# Patient Record
Sex: FEMALE | Race: WHITE | NOT HISPANIC OR LATINO | Employment: FULL TIME | URBAN - METROPOLITAN AREA
[De-identification: names, ages, dates, MRNs, and addresses within clinical notes are randomized per-mention and may not be internally consistent; named-entity substitution may affect disease eponyms.]

---

## 2021-08-18 NOTE — PROGRESS NOTES
[facility] - Obstetrics [facility] Gynecology Clinic    8/19/2021    Chief Complaint: PCOS, fertility      History of Present Illness:  Jessica Gonzales is a 26 year old G0 female who presents for consultation regarding PCOS and fertility.    She is here today with her mother.  Jessica travelled from [place] for this consultation. She has an aunt that lives in the area.  She reports that she was diagnosed with PCOS several years ago.  Describes longstanding (15 years) history of hirsutism mostly affecting her face.  She states that she shaves her face daily due to this.  Denies history of significant acne.  She reports that the PCOS was diagnosed based on cysts seen on her ultrasound and she has had laboratory work done as well.  With regard to her cycles she reports that they are regular, occurring every approximately 26 days.  She has been tracking them on her phone over the last 2 months. On chart review her cycles are reported as irregular.    With regards to treatment of PCOS she has never been on an oral contraceptive pill. She has taken supplements including cinnamon and colt-inositol (no longer taking). She was recently started on metformin and spironolactone.     She endorses a history of Crohn's disease and has had several colonoscopies in the past.  She is not currently on any medications with this and has not established care with an adult gastroenterologist. Reports well controlled.     Previous laboratory testing reviewed in Care Everywhere and detailed below:  10/13/2020  - 17 OHP: 53  - DHEAS: 358  - FSH: 5.5  - Estradiol: 52  - LH: 3.5  - Prolactin: 21.8  - TSH 1.17  7/20/2021  - A1c 5.0  - Creatinine 1.06    With regards to her mental health she reports history of anxiety and depression. Sees a mental health provider near home. Endorses fleeting thoughts suicidal ideation, no plan. Feels her mood is stable and has good family support.     Gynecologic History:  LMP- Patient's last menstrual period was  "2021.  Menarche - [age]  Menses- Regular q26 days  Menstrual Symptoms: Heavy, passes clots, significant cramping  Contraception: None  Last Pap Date: Patient reported normal pap smear within the past two years, on chart review most recent testing visible is: 2018 NILM, HPV positive HR other  History of abnormal pap: yes, reports mild changed; no history of cervical procedure  History of sexually transmitted diseases: HPV     Obstetric History:  G0      Current Medications:  Patient's Medications   New Prescriptions    No medications on file   Previous Medications    ASPIRIN-ACETAMINOPHEN-CAFFEINE (EXCEDRIN MIGRAINE) 250-250-65 MG TABLET    Take 2 tablets by mouth    CALCIUM CARBONATE 1250 (500 CA) MG CHEW    Take 2 tablets by mouth    FLUOXETINE (PROZAC) 10 MG CAPSULE    Take 10 mg by mouth    METFORMIN (GLUCOPHAGE-XR) 500 MG 24 HR TABLET    Take 1,000 mg by mouth    PANTOPRAZOLE (PROTONIX) 20 MG EC TABLET    Take 20 mg by mouth    PROPRANOLOL (INDERAL) 20 MG TABLET    Take 20 mg by mouth    SPIRONOLACTONE (ALDACTONE) 25 MG TABLET    Take 25 mg by mouth    ZOLPIDEM (AMBIEN) 10 MG TABLET    Take 10 mg by mouth   Modified Medications    No medications on file   Discontinued Medications    No medications on file     Past Medical History:  Insulin resistance/T2DM  Crohn's disease (not on treatment)  Nephritis (pt unsure of etiology)  Depression/Anxiety     Past Surgical History:  - Tonsillectomy  - Adenoidectomy  - Rhinoplasty  - Ear tubes  - Ankle surgery  - Colonoscopies     Allergies:  Allergies   Allergen Reactions     Ibuprofen Other (See Comments)     Patient cannot take due to her nephritis         Social History:  . Tobacco 1ppd. No drug or alcohol use. [place]  [employer] [place]      Family History:  Mother has history of  births. No history of infertility.     Exam:  /75   Pulse 61   Ht 1.702 m (5' 7\")   Wt 116.6 kg (257 lb)   LMP 2021   Breastfeeding " No   BMI 40.25 kg/m    Constitutional: well appearing, NAD  Cardiovascular: well perfused  Respiratory: unlabored breathing on room air   Pelvic Exam: Deferred  Psychiatric: mentation appears normal and affect normal/bright     Labs:   See above for labs in CareEverywhere     Imaging:   10/7/2020  IMPRESSION:   Retroverted uterus which appears normal.   Right ovary demonstrates a probable hemorrhagic cyst measuring 2.1 cm.   Left ovary demonstrates 2 probable hemorrhagic cysts, one measuring 2.6 cm and the other 3 cm in greatest dimension as well as a simple appearing 2.9 cm left ovarian cyst.   Large amount of fluid in the cul-de-sac.      Assessment/Plan:  26 year old G0 with pmh notable for insulin resistance/T2DM, Crohn's disease, nephritis, depression/anxiety, and obesity who presents for consultation regarding PCOS and fertility. Long discussion with the patient and her mother today about PCOS, fertility, and need to optimize her health prior to pregnancy. As they live in [place], Jessica will discuss the below recommendations/labs with her primary care provider to order. Discussed that she can schedule a telephone visit with our clinic to discuss laboratory results remotely. She will likely need to establish care with an OB/GYN in her area due to her multiple co-morbidities and need for further infertility evaluation.    #PCOS  #Infertility  - Currently taking metformin and spironolactone. Agree with continuing metformin especially in light of insulin resistance vs T2DM (most recent A1c 5.0). Reviewed that spironolactone is contraindicated in pregnancy and that she should discontinue this if she becomes pregnant.  - In discussing history with Jessica her FSH/estradiol were not timed labs. Recommend repeating these on day 3 of her cycle. Also discussed obtaining day 21 progesterone to assess for ovulation given her report of regular cycles  - Other labs: AMH (although this is likely to be elevated in the  setting of PCOS), prolactin (mildly elevated on last check), testosterone   - Continue to track menstrual cycles  - Also reviewed other etiologies of infertility aside from PCOS/ovulatory dysfunction including male factor and tubal factor infertility. No evidence of structural abnormalities on pelvic ultrasound. Consider further evaluation pending results of the above evaluation.      #Obesity  #Tobacco use  - Discussion with patient today about optimizing her personal health and well-being prior to pregnancy. Encouraged healthful diet and exercise. Discussed improvements seen with even 10% weight loss.   - Encouraged smoking cessation as tobacco use is associated with infertility and pregnancy complications     #Crohn's disease  - Not currently on medication, hasn't been seen for this in years. Recommend she establish care with adult gastroenterologist.    #Depression/Anxiety  - PHQ-9 8 today with several days of thoughts of being better off dead/self-harm reported. Patient endorses fleeting passive SI but has no intent or plan. Feels her mood is stable. Has good family support. Will continue to follow with her Mental Health provider in [place].    Staffed with Dr. Fabiana Silva MD PGY4    The Patient was seen in Resident Continuity Clinic by FELICITAS SILVA.  I reviewed the history & exam. Assessment and plan were jointly made.    Bree Jung MD

## 2021-08-19 ENCOUNTER — OFFICE VISIT (OUTPATIENT)
Dept: OBGYN | Facility: CLINIC | Age: 26
End: 2021-08-19
Payer: OTHER GOVERNMENT

## 2021-08-19 VITALS
SYSTOLIC BLOOD PRESSURE: 117 MMHG | DIASTOLIC BLOOD PRESSURE: 75 MMHG | HEIGHT: 67 IN | WEIGHT: 257 LBS | HEART RATE: 61 BPM | BODY MASS INDEX: 40.34 KG/M2

## 2021-08-19 DIAGNOSIS — N97.9 FEMALE INFERTILITY: ICD-10-CM

## 2021-08-19 DIAGNOSIS — F41.9 ANXIETY: ICD-10-CM

## 2021-08-19 DIAGNOSIS — E66.813 CLASS 3 SEVERE OBESITY WITH BODY MASS INDEX (BMI) OF 40.0 TO 44.9 IN ADULT, UNSPECIFIED OBESITY TYPE, UNSPECIFIED WHETHER SERIOUS COMORBIDITY PRESENT (H): ICD-10-CM

## 2021-08-19 DIAGNOSIS — E66.01 CLASS 3 SEVERE OBESITY WITH BODY MASS INDEX (BMI) OF 40.0 TO 44.9 IN ADULT, UNSPECIFIED OBESITY TYPE, UNSPECIFIED WHETHER SERIOUS COMORBIDITY PRESENT (H): ICD-10-CM

## 2021-08-19 DIAGNOSIS — E28.2 PCOS (POLYCYSTIC OVARIAN SYNDROME): Primary | ICD-10-CM

## 2021-08-19 DIAGNOSIS — F32.A DEPRESSION, UNSPECIFIED DEPRESSION TYPE: ICD-10-CM

## 2021-08-19 DIAGNOSIS — E88.819 INSULIN RESISTANCE: ICD-10-CM

## 2021-08-19 PROCEDURE — G0463 HOSPITAL OUTPT CLINIC VISIT: HCPCS

## 2021-08-19 PROCEDURE — 99203 OFFICE O/P NEW LOW 30 MIN: CPT | Performed by: OBSTETRICS & GYNECOLOGY

## 2021-08-19 RX ORDER — FLUOXETINE 10 MG/1
10 CAPSULE ORAL
COMMUNITY
Start: 2021-07-20 | End: 2021-10-18

## 2021-08-19 RX ORDER — PROPRANOLOL HYDROCHLORIDE 20 MG/1
20 TABLET ORAL
COMMUNITY
Start: 2021-07-20 | End: 2022-07-25

## 2021-08-19 RX ORDER — CALCIUM CARBONATE 500(1250)
2 TABLET,CHEWABLE ORAL
COMMUNITY

## 2021-08-19 RX ORDER — SPIRONOLACTONE 25 MG/1
25 TABLET ORAL
COMMUNITY
Start: 2021-07-20 | End: 2022-07-25

## 2021-08-19 RX ORDER — METFORMIN HCL 500 MG
1000 TABLET, EXTENDED RELEASE 24 HR ORAL
COMMUNITY
Start: 2021-07-20 | End: 2022-07-25

## 2021-08-19 RX ORDER — PANTOPRAZOLE SODIUM 20 MG/1
20 TABLET, DELAYED RELEASE ORAL
COMMUNITY
Start: 2021-03-29

## 2021-08-19 RX ORDER — ZOLPIDEM TARTRATE 10 MG/1
10 TABLET ORAL
COMMUNITY
Start: 2021-06-17

## 2021-08-19 ASSESSMENT — ANXIETY QUESTIONNAIRES
2. NOT BEING ABLE TO STOP OR CONTROL WORRYING: MORE THAN HALF THE DAYS
6. BECOMING EASILY ANNOYED OR IRRITABLE: MORE THAN HALF THE DAYS
GAD7 TOTAL SCORE: 10
1. FEELING NERVOUS, ANXIOUS, OR ON EDGE: SEVERAL DAYS
5. BEING SO RESTLESS THAT IT IS HARD TO SIT STILL: SEVERAL DAYS
7. FEELING AFRAID AS IF SOMETHING AWFUL MIGHT HAPPEN: NOT AT ALL
3. WORRYING TOO MUCH ABOUT DIFFERENT THINGS: MORE THAN HALF THE DAYS

## 2021-08-19 ASSESSMENT — MIFFLIN-ST. JEOR: SCORE: 1933.37

## 2021-08-19 ASSESSMENT — PATIENT HEALTH QUESTIONNAIRE - PHQ9
5. POOR APPETITE OR OVEREATING: MORE THAN HALF THE DAYS
SUM OF ALL RESPONSES TO PHQ QUESTIONS 1-9: 8

## 2021-08-19 NOTE — NURSING NOTE
Chief Complaint   Patient presents with     Establish Care     HX PCOS , fertility consult   Jacqueline Harrison LPN

## 2021-08-20 ASSESSMENT — ANXIETY QUESTIONNAIRES: GAD7 TOTAL SCORE: 10

## 2021-09-28 ENCOUNTER — TELEPHONE (OUTPATIENT)
Dept: OBGYN | Facility: CLINIC | Age: 26
End: 2021-09-28

## 2021-09-28 NOTE — TELEPHONE ENCOUNTER
[facility] Call Center    Phone Message    May a detailed message be left on voicemail: yes     Reason for Call: Other: PT Jessica called scheduled telephone follow-up to recent tests to determine results and care plan. First avail appt with Celena Silva MD was sched 11/2/21 added to wait list, per PT requests earlier visit if possible to discuss critical test results. Referring to [facility]  for rescheduling if possible.    Action Taken: Other: S     Travel Screening: Not Applicable

## 2021-09-28 NOTE — TELEPHONE ENCOUNTER
Called patient Left message to call back and schedule.   She would like it to be with jani Silva MD  Resident, not sure that we could get her in sooner.   Asked her to call back .      Thank you     Moy

## 2021-10-17 ENCOUNTER — HEALTH MAINTENANCE LETTER (OUTPATIENT)
Age: 26
End: 2021-10-17

## 2021-11-02 ENCOUNTER — VIRTUAL VISIT (OUTPATIENT)
Dept: OBGYN | Facility: CLINIC | Age: 26
End: 2021-11-02
Attending: OBSTETRICS & GYNECOLOGY
Payer: OTHER GOVERNMENT

## 2021-11-02 DIAGNOSIS — Z31.41 FERTILITY TESTING: Primary | ICD-10-CM

## 2021-11-02 PROCEDURE — 99212 OFFICE O/P EST SF 10 MIN: CPT | Mod: 95 | Performed by: OBSTETRICS & GYNECOLOGY

## 2021-11-02 NOTE — LETTER
"11/2/2021       RE: Jessica Gonzales  [address] [facility] [address]     Dear Colleague,    Thank you for referring your patient, Jessica Gonzales, to the [facility] [place] [facility]. Please see a copy of my visit note below.    [facility] Telephone Visit    SUBJECTIVE     Jessica Gonzales is a 26 year old female who is being evaluated via a billable telephone visit.    Patient opted to conduct today's return visit via telephone secondary to the COVID-19 pandemic vs. an in person visit to the clinic.    I spoke with: Jessica    The patient has been notified of following:   \"This telephone visit will be conducted via a call between you and your physician/provider. We have found that certain health care needs can be provided without the need for a physical exam.  This service lets us provide the care you need with a short phone conversation.  If a prescription is necessary we can send it directly to your pharmacy.  If lab work is needed we can place an order for that and you can then stop by our lab to have the test done at a later time.  If during the course of the call the physician/provider feels a telephone visit is not appropriate, you will not be charged for this service.\"     The reason for the telephone visit: follow-up infertility, AUB    S: Jessica has been doing well since last visit. No changes in her health. She hasn't been seen for her Crohn's disease yet.   She continues to use OPKs but hasn't gotten a positive result. Reports her cycles have been regular, coming every 26 days. Labs were drawn on day 3 of her cycle.     O:  No vitals as remote visit  General: NAD, A&Ox3  Resp: Non-labored breathing    Labs:  9/11/21  FSH 5.3  Estradiol 56  Prolactin 13.2  Progesterone <0.5  AMH 1.4  Testosterone 33    A/P: 25 yo G0 here for telephone follow up for primary infertility. She has been previously diagnosed with PCOS although " cycles are regular. Reviewed lab evaluation done at outside clinic which is overall unremarkable, day 3 labs normal. Discussed AMH of 1.4, which is lower than the median for her age and also not consistent with PCOS.    - Discussed need for day 20-21 progesterone to evaluate for ovulation, patient will contact her primary care doctor to schedule  - Will complete infertility evaluation with [facility], pelvic US. Reviewed option of getting this done here vs inquiring to see if can be performed in [place]  - Recommend semen analysis, her partner will contact his primary care doctor to order. If they have trouble getting this done in [place] she will contact clinic  - Encouraged her to continue to work on her overall health: diet, exercise, smoking cessation, GI follow up for Crohn's disease    Patient to follow up via telephone visit after these result and will discuss next steps    Phone call start: 1345  Phone call end: 1400  Phone call duration:  15 minutes  Celena Silva MD  I agree with note as above. The patient was cared for in continuity clinic by the resident doctor.  Assessment and plan were jointly made.  Bree Arboleda MD

## 2021-11-02 NOTE — PROGRESS NOTES
"[facility] Telephone Visit    SUBJECTIVE     Jessica Gonzales is a 26 year old female who is being evaluated via a billable telephone visit.    Patient opted to conduct today's return visit via telephone secondary to the COVID-19 pandemic vs. an in person visit to the clinic.    I spoke with: Jessica    The patient has been notified of following:   \"This telephone visit will be conducted via a call between you and your physician/provider. We have found that certain health care needs can be provided without the need for a physical exam.  This service lets us provide the care you need with a short phone conversation.  If a prescription is necessary we can send it directly to your pharmacy.  If lab work is needed we can place an order for that and you can then stop by our lab to have the test done at a later time.  If during the course of the call the physician/provider feels a telephone visit is not appropriate, you will not be charged for this service.\"     The reason for the telephone visit: follow-up infertility, AUB    S: Jessica has been doing well since last visit. No changes in her health. She hasn't been seen for her Crohn's disease yet.   She continues to use OPKs but hasn't gotten a positive result. Reports her cycles have been regular, coming every 26 days. Labs were drawn on day 3 of her cycle.     O:  No vitals as remote visit  General: NAD, A&Ox3  Resp: Non-labored breathing    Labs:  9/11/21  FSH 5.3  Estradiol 56  Prolactin 13.2  Progesterone <0.5  AMH 1.4  Testosterone 33    A/P: 27 yo G0 here for telephone follow up for primary infertility. She has been previously diagnosed with PCOS although cycles are regular. Reviewed lab evaluation done at outside clinic which is overall unremarkable, day 3 labs normal. Discussed AMH of 1.4, which is lower than the median for her age and also not consistent with PCOS.    - Discussed need for day 20-21 progesterone to evaluate for ovulation, patient will " contact her primary care doctor to schedule  - Will complete infertility evaluation with [facility], pelvic US. Reviewed option of getting this done here vs inquiring to see if can be performed in [place]  - Recommend semen analysis, her partner will contact his primary care doctor to order. If they have trouble getting this done in [place] she will contact clinic  - Encouraged her to continue to work on her overall health: diet, exercise, smoking cessation, GI follow up for Crohn's disease    Patient to follow up via telephone visit after these result and will discuss next steps    Phone call start: 1345  Phone call end: 1400  Phone call duration:  15 minutes  Celena Silva MD  I agree with note as above. The patient was cared for in continuity clinic by the resident doctor.  Assessment and plan were jointly made.  Bree Arboleda MD

## 2022-10-03 ENCOUNTER — HEALTH MAINTENANCE LETTER (OUTPATIENT)
Age: 27
End: 2022-10-03

## 2023-02-11 ENCOUNTER — HEALTH MAINTENANCE LETTER (OUTPATIENT)
Age: 28
End: 2023-02-11

## 2024-03-09 ENCOUNTER — HEALTH MAINTENANCE LETTER (OUTPATIENT)
Age: 29
End: 2024-03-09